# Patient Record
Sex: MALE | Race: OTHER | HISPANIC OR LATINO | URBAN - METROPOLITAN AREA
[De-identification: names, ages, dates, MRNs, and addresses within clinical notes are randomized per-mention and may not be internally consistent; named-entity substitution may affect disease eponyms.]

---

## 2020-09-28 ENCOUNTER — EMERGENCY (EMERGENCY)
Facility: HOSPITAL | Age: 35
LOS: 1 days | Discharge: HOME | End: 2020-09-28
Attending: STUDENT IN AN ORGANIZED HEALTH CARE EDUCATION/TRAINING PROGRAM
Payer: MEDICAID

## 2020-09-28 VITALS
RESPIRATION RATE: 22 BRPM | OXYGEN SATURATION: 98 % | TEMPERATURE: 98 F | SYSTOLIC BLOOD PRESSURE: 133 MMHG | HEART RATE: 75 BPM | DIASTOLIC BLOOD PRESSURE: 86 MMHG

## 2020-09-28 LAB
APPEARANCE UR: CLEAR — SIGNIFICANT CHANGE UP
BILIRUB UR-MCNC: NEGATIVE — SIGNIFICANT CHANGE UP
COLOR SPEC: YELLOW — SIGNIFICANT CHANGE UP
DIFF PNL FLD: NEGATIVE — SIGNIFICANT CHANGE UP
GLUCOSE UR QL: NEGATIVE — SIGNIFICANT CHANGE UP
KETONES UR-MCNC: NEGATIVE — SIGNIFICANT CHANGE UP
LEUKOCYTE ESTERASE UR-ACNC: NEGATIVE — SIGNIFICANT CHANGE UP
NITRITE UR-MCNC: NEGATIVE — SIGNIFICANT CHANGE UP
PH UR: 6 — SIGNIFICANT CHANGE UP (ref 5–8)
PROT UR-MCNC: NEGATIVE — SIGNIFICANT CHANGE UP
SP GR SPEC: 1.01 — SIGNIFICANT CHANGE UP (ref 1.01–1.02)
UROBILINOGEN FLD QL: NEGATIVE — SIGNIFICANT CHANGE UP

## 2020-09-28 PROCEDURE — 71046 X-RAY EXAM CHEST 2 VIEWS: CPT | Mod: 26

## 2020-09-28 PROCEDURE — 99283 EMERGENCY DEPT VISIT LOW MDM: CPT | Mod: 25

## 2020-09-28 PROCEDURE — 96372 THER/PROPH/DIAG INJ SC/IM: CPT

## 2020-09-28 PROCEDURE — 99283 EMERGENCY DEPT VISIT LOW MDM: CPT

## 2020-09-28 PROCEDURE — 81001 URINALYSIS AUTO W/SCOPE: CPT

## 2020-09-28 PROCEDURE — 93005 ELECTROCARDIOGRAM TRACING: CPT

## 2020-09-28 PROCEDURE — 71046 X-RAY EXAM CHEST 2 VIEWS: CPT

## 2020-09-28 RX ORDER — IBUPROFEN 200 MG
1 TABLET ORAL
Qty: 20 | Refills: 0
Start: 2020-09-28 | End: 2020-10-02

## 2020-09-28 RX ORDER — LIDOCAINE 4 G/100G
1 CREAM TOPICAL ONCE
Refills: 0 | Status: COMPLETED | OUTPATIENT
Start: 2020-09-28 | End: 2020-09-28

## 2020-09-28 RX ORDER — KETOROLAC TROMETHAMINE 30 MG/ML
30 SYRINGE (ML) INJECTION ONCE
Refills: 0 | Status: DISCONTINUED | OUTPATIENT
Start: 2020-09-28 | End: 2020-09-28

## 2020-09-28 RX ORDER — LIDOCAINE 4 G/100G
1 CREAM TOPICAL
Qty: 5 | Refills: 0
Start: 2020-09-28 | End: 2020-10-02

## 2020-09-28 RX ADMIN — Medication 30 MILLIGRAM(S): at 15:32

## 2020-09-28 RX ADMIN — Medication 30 MILLIGRAM(S): at 16:13

## 2020-09-28 RX ADMIN — LIDOCAINE 1 PATCH: 4 CREAM TOPICAL at 15:55

## 2020-09-28 NOTE — ED PROVIDER NOTE - CHPI ED SYMPTOMS NEG
no fever, chills, no dysuria, no nausea and vomiting, no chest pain, no shortness of breath, no rash

## 2020-09-28 NOTE — ED ADULT NURSE NOTE - NSIMPLEMENTINTERV_GEN_ALL_ED
Implemented All Universal Safety Interventions:  [place] to call system. Call bell, personal items and telephone within reach. Instruct patient to call for assistance. Room bathroom lighting operational. Non-slip footwear when patient is off stretcher. Physically safe environment: no spills, clutter or unnecessary equipment. Stretcher in lowest position, wheels locked, appropriate side rails in place.

## 2020-09-28 NOTE — ED PROVIDER NOTE - PROGRESS NOTE DETAILS
Feeling better. Discussed with patient that presentation may be early shingles and what to look for to come back to the ER. CXR NAD. ECG NSR. UA negative for blood. History and findings consistent with diagnosis fo costochondritis. Will give Rx  and PMD follow up in 2-3 days. Pt is well appearing walking with steady gait, stable for discharge and follow up without fail with medical doctor. I had a detailed discussion with the patient and/or guardian regarding the historical points, exam findings, and any diagnostic results supporting the discharge diagnosis. Pt educated on care and need for follow up. Strict return instructions and red flag signs and symptoms discussed with patient. Questions answered. Pt shows understanding of discharge information and agrees to follow.

## 2020-09-28 NOTE — ED PROVIDER NOTE - GASTROINTESTINAL, MLM
Abdomen soft, non-tender, no guarding. Abdomen soft, non-tender, no guarding. Negative Najera's sign

## 2020-09-28 NOTE — ED PROVIDER NOTE - SKIN, MLM
Skin normal color for race, warm, dry and intact. No evidence of rash. Skin normal color for race, warm, dry and intact. No evidence of rash. No vesicular rash.

## 2020-09-28 NOTE — ED PROVIDER NOTE - CLINICAL SUMMARY MEDICAL DECISION MAKING FREE TEXT BOX
Pt with right sided rib pain for 4 days. Vital signs appears with nrml limits. Given history and exam, no gallstones, no kidney stones, low suspicion of PTX. Will do UA for infection. Will give Toradol for pain.

## 2020-09-28 NOTE — ED PROVIDER NOTE - PATIENT PORTAL LINK FT
You can access the FollowMyHealth Patient Portal offered by [facility] by registering at the following website: [email or web address]. By joining [employer]’s FollowMyHealth portal, you will also be able to view your health information using other applications (apps) compatible with our system.

## 2020-09-28 NOTE — ED PROVIDER NOTE - OBJECTIVE STATEMENT
36 y/o M patient with no significant PMHx and no significant PSHx presents to the ED with c/o 4d of rib pain. Patient states taking Advil with some relief for a few hours and feels pain with movement. Patient describes the pain as sharp and continuous. Patient denies any injuries. 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 Patient denies any fever, chills, hematuria, dysuria, nausea and vomiting, chest pain, shortness of breath, rash, or any other complains. 36 y/o M patient with no significant PMHx and no significant PSHx presents to the ED with c/o 4d of rib pain. Patient states taking Advil with some relief for a few hours and feels pain with movement. Patient describes the pain as sharp and continuous, non-radiating. No aggravation of pain with food intake. Patient denies any injuries. 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 Patient denies any fever, chills, hematuria, dysuria, nausea and vomiting, chest pain, shortness of breath, rash, or any other complains.

## 2020-09-28 NOTE — ED PROVIDER NOTE - ATTENDING CONTRIBUTION TO CARE
I performed the initial face to face bedside interview with this patient regarding history of present illness, review of symptoms and past medical, social and family history.  I completed an independent physical examination.  I was the initial provider who evaluated this patient.  The history, review of symptoms and examination was documented by the scribe in my presence and I attest to the accuracy of the documentation.  I have signed out the follow up of any pending tests (i.e. labs, radiological studies) to the PA/NP.  I have discussed the patient’s plan of care and disposition with the PA/NP. well appearing male normal work of breathing, no rashes or erythema, tender along ribs 6-7. likely MSK pain.

## 2020-10-04 ENCOUNTER — EMERGENCY (EMERGENCY)
Facility: HOSPITAL | Age: 35
LOS: 1 days | Discharge: HOME | End: 2020-10-04
Attending: EMERGENCY MEDICINE
Payer: MEDICAID

## 2020-10-04 VITALS
SYSTOLIC BLOOD PRESSURE: 131 MMHG | TEMPERATURE: 98 F | WEIGHT: 179.9 LBS | DIASTOLIC BLOOD PRESSURE: 90 MMHG | HEART RATE: 78 BPM | RESPIRATION RATE: 20 BRPM | HEIGHT: 66 IN | OXYGEN SATURATION: 99 %

## 2020-10-04 PROBLEM — Z78.9 OTHER SPECIFIED HEALTH STATUS: Chronic | Status: ACTIVE | Noted: 2020-09-28

## 2020-10-04 PROCEDURE — 99283 EMERGENCY DEPT VISIT LOW MDM: CPT

## 2020-10-04 RX ORDER — VALACYCLOVIR 500 MG/1
1 TABLET, FILM COATED ORAL
Qty: 21 | Refills: 0
Start: 2020-10-04 | End: 2020-10-10

## 2020-10-04 RX ORDER — OXYCODONE AND ACETAMINOPHEN 5; 325 MG/1; MG/1
1 TABLET ORAL ONCE
Refills: 0 | Status: DISCONTINUED | OUTPATIENT
Start: 2020-10-04 | End: 2020-10-04

## 2020-10-04 RX ORDER — VALACYCLOVIR 500 MG/1
1000 TABLET, FILM COATED ORAL ONCE
Refills: 0 | Status: COMPLETED | OUTPATIENT
Start: 2020-10-04 | End: 2020-10-04

## 2020-10-04 RX ADMIN — VALACYCLOVIR 1000 MILLIGRAM(S): 500 TABLET, FILM COATED ORAL at 13:57

## 2020-10-04 RX ADMIN — OXYCODONE AND ACETAMINOPHEN 1 TABLET(S): 5; 325 TABLET ORAL at 13:57

## 2020-10-04 RX ADMIN — OXYCODONE AND ACETAMINOPHEN 1 TABLET(S): 5; 325 TABLET ORAL at 14:11

## 2020-10-04 NOTE — ED PROVIDER NOTE - ATTENDING CONTRIBUTION TO CARE
seen with acp  here for right flank pain for  days no nausea no vomiting  abd soft non tender bs active  vesicular skin lesions noted  patient has shingles  will start on valtrex and  pain relievers  Agree with acp assessment and disposition and hx and physical

## 2020-10-04 NOTE — ED ADULT NURSE NOTE - OBJECTIVE STATEMENT
Pt c/o Pain to rib area.  Rash developed to right flank area on Wednesday. No acute distress noted, denies chest pain, no shortness of breath indicated. Safety maintained.

## 2020-10-04 NOTE — ED PROVIDER NOTE - PATIENT PORTAL LINK FT
You can access the FollowMyHealth Patient Portal offered by [facility] by registering at the following website: [email or web address]. By joining [employer]’s FollowMyHealth portal, you will also be able to view your health information using other applications (apps) compatible with our system.

## 2020-10-04 NOTE — ED PROVIDER NOTE - CLINICAL SUMMARY MEDICAL DECISION MAKING FREE TEXT BOX
Patient presented 5 days ago with R flank pain and unremarkable workup. Today patient reports same pain with rash to acosta-posterior aspect with R sided ribcage consistent with shingles. Will dc with Rx for Valtrex and PMd follow up in 1-2 days.

## 2020-10-04 NOTE — ED PROVIDER NOTE - SKIN RASH DESCRIPTION
PAPULAR/right rib cage and right flank has a small papular rash along the dermatomal distribution with no crusting.

## 2020-10-04 NOTE — ED PROVIDER NOTE - OBJECTIVE STATEMENT
36 y/o M presents to ED for rash to his right flank area. Pt states he was seen 5d ago for evaluation for his right flank pain. Pt adds his pain is the same as the beginning and is partially relieved with ibuprofen but since yesterday, he noticed a rash on the right side of his rib bone. Pt denies fever, chills, nausea, vomiting, diarrhea, urinary symptoms or any other complaints. NKDA.

## 2020-10-04 NOTE — ED PROVIDER NOTE - NSFOLLOWUPINSTRUCTIONS_ED_ALL_ED_FT
Follow up with the primary care doctor in 1-2 days.  If you experience any new or worsening symptoms or if you are concerned you can always come back to the emergency for a re-evaluation.  If there were any prescriptions given to you during the visit today take them as prescribed. If you have any questions you can ask the pharmacist.

## 2020-10-04 NOTE — ED ADULT NURSE NOTE - NSIMPLEMENTINTERV_GEN_ALL_ED
Implemented All Universal Safety Interventions:  [place] to call system. Call bell, personal items and telephone within reach. Instruct patient to call for assistance. Room bathroom lighting operational. Non-slip footwear when patient is off stretcher. Physically safe environment: no spills, clutter or unnecessary equipment. Stretcher in lowest position, wheels locked, appropriate side rails in place.

## 2020-10-15 ENCOUNTER — EMERGENCY (EMERGENCY)
Facility: HOSPITAL | Age: 35
LOS: 1 days | Discharge: HOME | End: 2020-10-15
Attending: EMERGENCY MEDICINE
Payer: MEDICAID

## 2020-10-15 VITALS
HEART RATE: 73 BPM | TEMPERATURE: 98 F | RESPIRATION RATE: 19 BRPM | DIASTOLIC BLOOD PRESSURE: 94 MMHG | OXYGEN SATURATION: 98 % | HEIGHT: 66 IN | WEIGHT: 179.9 LBS | SYSTOLIC BLOOD PRESSURE: 159 MMHG

## 2020-10-15 PROCEDURE — 99283 EMERGENCY DEPT VISIT LOW MDM: CPT

## 2020-10-15 PROCEDURE — U0003: CPT

## 2020-10-15 NOTE — ED PROVIDER NOTE - PATIENT PORTAL LINK FT
You can access the FollowMyHealth Patient Portal offered by [facility] by registering at the following website: [email or web address]. By joining [employer]’s FollowMyHealth portal, you will also be able to view your health information using other applications (apps) compatible with our system.

## 2020-10-15 NOTE — ED PROVIDER NOTE - OBJECTIVE STATEMENT
34 y/o male with PMHx of HTN, HLD presents to the ED for COVID swab x today. Pt notes he is traveling soon and needs the COVID swab. Pt has no URI Sx. Pt denies fever, cough, shortness of breath, or any other complaints. NKDA.

## 2020-10-15 NOTE — ED ADULT TRIAGE NOTE - WEIGHT IN KG
Chief Complaint   Patient presents with   • Post-op     1 week s/p (R) shoulder arthroscopic RCR, debridement of labrum 05/22/2018           HPI  His 1 week follow-up right shoulder rotator cuff repair doing well.      There were no vitals filed for this visit.      Physical Exam:    Complains of some numbness in his right hand.  He noticed this since the block in surgery.    ICD-10-CM ICD-9-CM   1. Status post right rotator cuff repair Z98.890 V45.89     He will follow-up in 3 weeks to start physical therapy   81.6

## 2020-10-16 LAB — SARS-COV-2 RNA SPEC QL NAA+PROBE: SIGNIFICANT CHANGE UP

## 2021-08-06 ENCOUNTER — EMERGENCY (EMERGENCY)
Facility: HOSPITAL | Age: 36
LOS: 1 days | Discharge: HOME | End: 2021-08-06
Attending: STUDENT IN AN ORGANIZED HEALTH CARE EDUCATION/TRAINING PROGRAM
Payer: MEDICAID

## 2021-08-06 VITALS
SYSTOLIC BLOOD PRESSURE: 134 MMHG | OXYGEN SATURATION: 98 % | DIASTOLIC BLOOD PRESSURE: 87 MMHG | TEMPERATURE: 98 F | HEART RATE: 62 BPM | WEIGHT: 179.9 LBS | RESPIRATION RATE: 16 BRPM | HEIGHT: 66 IN

## 2021-08-06 LAB — SARS-COV-2 RNA SPEC QL NAA+PROBE: SIGNIFICANT CHANGE UP

## 2021-08-06 PROCEDURE — 99282 EMERGENCY DEPT VISIT SF MDM: CPT

## 2021-08-06 PROCEDURE — 87635 SARS-COV-2 COVID-19 AMP PRB: CPT

## 2021-08-06 PROCEDURE — 99283 EMERGENCY DEPT VISIT LOW MDM: CPT

## 2021-08-06 NOTE — ED PROVIDER NOTE - PATIENT PORTAL LINK FT
You can access the FollowMyHealth Patient Portal offered by [facility] by registering at the following website: [email or web address]. By joining [employer]’s FollowMyHealth portal, you will also be able to view your health information using other applications (apps) compatible with our system.

## 2021-08-06 NOTE — ED PROVIDER NOTE - OBJECTIVE STATEMENT
36y M w/ no pmh presenting for COVID swab to start work. Has been asymptomatic. No other complaints.

## 2023-08-21 NOTE — ED PROVIDER NOTE - NS_EDPROVIDERDISPOUSERTYPE_ED_A_ED
Scribe Attestation (For Scribes USE Only)... Scribe Attestation (For Scribes USE Only).../Attending Attestation (For Attendings USE Only)... EOMI/conjunctiva clear/normal

## 2024-05-08 NOTE — ED ADULT NURSE NOTE - NSFALLRSKINDICATORS_ED_ALL_ED
No care due was identified.  [facility] Embedded Care Due Messages. Reference number: [id number].   5/08/2024 12:44:17 AM CDT   no

## 2024-07-17 ENCOUNTER — INPATIENT (INPATIENT)
Facility: HOSPITAL | Age: 39
LOS: 0 days | Discharge: HOME | DRG: 395 | End: 2024-07-18
Attending: SURGERY | Admitting: SURGERY
Payer: COMMERCIAL

## 2024-07-17 ENCOUNTER — TRANSCRIPTION ENCOUNTER (OUTPATIENT)
Age: 39
End: 2024-07-17

## 2024-07-17 VITALS
HEART RATE: 66 BPM | DIASTOLIC BLOOD PRESSURE: 94 MMHG | WEIGHT: 160.06 LBS | RESPIRATION RATE: 17 BRPM | SYSTOLIC BLOOD PRESSURE: 130 MMHG | OXYGEN SATURATION: 100 % | TEMPERATURE: 98 F

## 2024-07-17 PROCEDURE — 99285 EMERGENCY DEPT VISIT HI MDM: CPT

## 2024-07-18 ENCOUNTER — TRANSCRIPTION ENCOUNTER (OUTPATIENT)
Age: 39
End: 2024-07-18

## 2024-07-18 ENCOUNTER — RESULT REVIEW (OUTPATIENT)
Age: 39
End: 2024-07-18

## 2024-07-18 VITALS
SYSTOLIC BLOOD PRESSURE: 124 MMHG | DIASTOLIC BLOOD PRESSURE: 75 MMHG | OXYGEN SATURATION: 98 % | TEMPERATURE: 97 F | HEART RATE: 73 BPM | RESPIRATION RATE: 16 BRPM

## 2024-07-18 DIAGNOSIS — K35.80 UNSPECIFIED ACUTE APPENDICITIS: ICD-10-CM

## 2024-07-18 LAB
ALBUMIN SERPL ELPH-MCNC: 4.7 G/DL — SIGNIFICANT CHANGE UP (ref 3.3–5)
ALP SERPL-CCNC: 51 U/L — SIGNIFICANT CHANGE UP (ref 40–120)
ALT FLD-CCNC: 20 U/L — SIGNIFICANT CHANGE UP (ref 10–45)
ANION GAP SERPL CALC-SCNC: 14 MMOL/L — SIGNIFICANT CHANGE UP (ref 5–17)
APPEARANCE UR: CLEAR — SIGNIFICANT CHANGE UP
APTT BLD: 24.9 SEC — SIGNIFICANT CHANGE UP (ref 24.5–35.6)
APTT BLD: 29.7 SEC — SIGNIFICANT CHANGE UP (ref 24.5–35.6)
AST SERPL-CCNC: 21 U/L — SIGNIFICANT CHANGE UP (ref 10–40)
BASOPHILS # BLD AUTO: 0.03 K/UL — SIGNIFICANT CHANGE UP (ref 0–0.2)
BASOPHILS NFR BLD AUTO: 0.4 % — SIGNIFICANT CHANGE UP (ref 0–2)
BILIRUB SERPL-MCNC: 0.9 MG/DL — SIGNIFICANT CHANGE UP (ref 0.2–1.2)
BILIRUB UR-MCNC: NEGATIVE — SIGNIFICANT CHANGE UP
BUN SERPL-MCNC: 18 MG/DL — SIGNIFICANT CHANGE UP (ref 7–23)
CALCIUM SERPL-MCNC: 10.3 MG/DL — SIGNIFICANT CHANGE UP (ref 8.4–10.5)
CHLORIDE SERPL-SCNC: 101 MMOL/L — SIGNIFICANT CHANGE UP (ref 96–108)
CO2 SERPL-SCNC: 24 MMOL/L — SIGNIFICANT CHANGE UP (ref 22–31)
COLOR SPEC: YELLOW — SIGNIFICANT CHANGE UP
CREAT SERPL-MCNC: 1.12 MG/DL — SIGNIFICANT CHANGE UP (ref 0.5–1.3)
DIFF PNL FLD: NEGATIVE — SIGNIFICANT CHANGE UP
EGFR: 86 ML/MIN/1.73M2 — SIGNIFICANT CHANGE UP
EOSINOPHIL # BLD AUTO: 0 K/UL — SIGNIFICANT CHANGE UP (ref 0–0.5)
EOSINOPHIL NFR BLD AUTO: 0 % — SIGNIFICANT CHANGE UP (ref 0–6)
GLUCOSE SERPL-MCNC: 93 MG/DL — SIGNIFICANT CHANGE UP (ref 70–99)
GLUCOSE UR QL: NEGATIVE MG/DL — SIGNIFICANT CHANGE UP
HCT VFR BLD CALC: 41.7 % — SIGNIFICANT CHANGE UP (ref 39–50)
HGB BLD-MCNC: 14.4 G/DL — SIGNIFICANT CHANGE UP (ref 13–17)
IMM GRANULOCYTES NFR BLD AUTO: 0.1 % — SIGNIFICANT CHANGE UP (ref 0–0.9)
INR BLD: 1.08 RATIO — SIGNIFICANT CHANGE UP (ref 0.85–1.18)
INR BLD: 1.13 RATIO — SIGNIFICANT CHANGE UP (ref 0.85–1.18)
KETONES UR-MCNC: ABNORMAL MG/DL
LACTATE BLDV-MCNC: 0.7 MMOL/L — SIGNIFICANT CHANGE UP (ref 0.5–2)
LEUKOCYTE ESTERASE UR-ACNC: NEGATIVE — SIGNIFICANT CHANGE UP
LIDOCAIN IGE QN: 16 U/L — SIGNIFICANT CHANGE UP (ref 7–60)
LYMPHOCYTES # BLD AUTO: 1.66 K/UL — SIGNIFICANT CHANGE UP (ref 1–3.3)
LYMPHOCYTES # BLD AUTO: 21.5 % — SIGNIFICANT CHANGE UP (ref 13–44)
MCHC RBC-ENTMCNC: 31 PG — SIGNIFICANT CHANGE UP (ref 27–34)
MCHC RBC-ENTMCNC: 34.5 GM/DL — SIGNIFICANT CHANGE UP (ref 32–36)
MCV RBC AUTO: 89.9 FL — SIGNIFICANT CHANGE UP (ref 80–100)
MONOCYTES # BLD AUTO: 0.57 K/UL — SIGNIFICANT CHANGE UP (ref 0–0.9)
MONOCYTES NFR BLD AUTO: 7.4 % — SIGNIFICANT CHANGE UP (ref 2–14)
NEUTROPHILS # BLD AUTO: 5.44 K/UL — SIGNIFICANT CHANGE UP (ref 1.8–7.4)
NEUTROPHILS NFR BLD AUTO: 70.6 % — SIGNIFICANT CHANGE UP (ref 43–77)
NITRITE UR-MCNC: NEGATIVE — SIGNIFICANT CHANGE UP
NRBC # BLD: 0 /100 WBCS — SIGNIFICANT CHANGE UP (ref 0–0)
PH UR: 5.5 — SIGNIFICANT CHANGE UP (ref 5–8)
PLATELET # BLD AUTO: 224 K/UL — SIGNIFICANT CHANGE UP (ref 150–400)
POTASSIUM SERPL-MCNC: 4 MMOL/L — SIGNIFICANT CHANGE UP (ref 3.5–5.3)
POTASSIUM SERPL-SCNC: 4 MMOL/L — SIGNIFICANT CHANGE UP (ref 3.5–5.3)
PROT SERPL-MCNC: 8.4 G/DL — HIGH (ref 6–8.3)
PROT UR-MCNC: NEGATIVE MG/DL — SIGNIFICANT CHANGE UP
PROTHROM AB SERPL-ACNC: 11.8 SEC — SIGNIFICANT CHANGE UP (ref 9.5–13)
PROTHROM AB SERPL-ACNC: 11.9 SEC — SIGNIFICANT CHANGE UP (ref 9.5–13)
RBC # BLD: 4.64 M/UL — SIGNIFICANT CHANGE UP (ref 4.2–5.8)
RBC # FLD: 12.2 % — SIGNIFICANT CHANGE UP (ref 10.3–14.5)
SODIUM SERPL-SCNC: 139 MMOL/L — SIGNIFICANT CHANGE UP (ref 135–145)
SP GR SPEC: 1.03 — SIGNIFICANT CHANGE UP (ref 1–1.03)
UROBILINOGEN FLD QL: 0.2 MG/DL — SIGNIFICANT CHANGE UP (ref 0.2–1)
WBC # BLD: 7.71 K/UL — SIGNIFICANT CHANGE UP (ref 3.8–10.5)
WBC # FLD AUTO: 7.71 K/UL — SIGNIFICANT CHANGE UP (ref 3.8–10.5)

## 2024-07-18 PROCEDURE — C9399: CPT

## 2024-07-18 PROCEDURE — 76870 US EXAM SCROTUM: CPT | Mod: 26

## 2024-07-18 PROCEDURE — 86901 BLOOD TYPING SEROLOGIC RH(D): CPT

## 2024-07-18 PROCEDURE — 74177 CT ABD & PELVIS W/CONTRAST: CPT | Mod: 26,MC

## 2024-07-18 PROCEDURE — 86900 BLOOD TYPING SEROLOGIC ABO: CPT

## 2024-07-18 PROCEDURE — 86850 RBC ANTIBODY SCREEN: CPT

## 2024-07-18 PROCEDURE — 83605 ASSAY OF LACTIC ACID: CPT

## 2024-07-18 PROCEDURE — 85730 THROMBOPLASTIN TIME PARTIAL: CPT

## 2024-07-18 PROCEDURE — 85610 PROTHROMBIN TIME: CPT

## 2024-07-18 PROCEDURE — 74177 CT ABD & PELVIS W/CONTRAST: CPT | Mod: MC

## 2024-07-18 PROCEDURE — 99285 EMERGENCY DEPT VISIT HI MDM: CPT | Mod: 25

## 2024-07-18 PROCEDURE — C1889: CPT

## 2024-07-18 PROCEDURE — 96375 TX/PRO/DX INJ NEW DRUG ADDON: CPT

## 2024-07-18 PROCEDURE — 87086 URINE CULTURE/COLONY COUNT: CPT

## 2024-07-18 PROCEDURE — 96374 THER/PROPH/DIAG INJ IV PUSH: CPT

## 2024-07-18 PROCEDURE — 81003 URINALYSIS AUTO W/O SCOPE: CPT

## 2024-07-18 PROCEDURE — 83690 ASSAY OF LIPASE: CPT

## 2024-07-18 PROCEDURE — 93975 VASCULAR STUDY: CPT

## 2024-07-18 PROCEDURE — 76870 US EXAM SCROTUM: CPT

## 2024-07-18 PROCEDURE — 93975 VASCULAR STUDY: CPT | Mod: 26

## 2024-07-18 PROCEDURE — 80053 COMPREHEN METABOLIC PANEL: CPT

## 2024-07-18 PROCEDURE — 85025 COMPLETE CBC W/AUTO DIFF WBC: CPT

## 2024-07-18 PROCEDURE — 88304 TISSUE EXAM BY PATHOLOGIST: CPT | Mod: 26

## 2024-07-18 PROCEDURE — 44970 LAPAROSCOPY APPENDECTOMY: CPT | Mod: GC

## 2024-07-18 PROCEDURE — 88304 TISSUE EXAM BY PATHOLOGIST: CPT

## 2024-07-18 DEVICE — STAPLER COVIDIEN TRI-STAPLE 45MM PURPLE RELOAD: Type: IMPLANTABLE DEVICE | Status: FUNCTIONAL

## 2024-07-18 DEVICE — CLIP APPLIER COVIDIEN ENDOCLIP III 5MM: Type: IMPLANTABLE DEVICE | Status: FUNCTIONAL

## 2024-07-18 RX ORDER — DEXTROSE MONOHYDRATE AND SODIUM CHLORIDE 5; .3 G/100ML; G/100ML
1000 INJECTION, SOLUTION INTRAVENOUS
Refills: 0 | Status: DISCONTINUED | OUTPATIENT
Start: 2024-07-18 | End: 2024-07-18

## 2024-07-18 RX ORDER — PIPERACILLIN SODIUM AND TAZOBACTAM SODIUM 3; .375 G/15ML; G/15ML
3.38 INJECTION, POWDER, LYOPHILIZED, FOR SOLUTION INTRAVENOUS ONCE
Refills: 0 | Status: DISCONTINUED | OUTPATIENT
Start: 2024-07-18 | End: 2024-07-18

## 2024-07-18 RX ORDER — PIPERACILLIN SODIUM AND TAZOBACTAM SODIUM 3; .375 G/15ML; G/15ML
3.38 INJECTION, POWDER, LYOPHILIZED, FOR SOLUTION INTRAVENOUS EVERY 8 HOURS
Refills: 0 | Status: DISCONTINUED | OUTPATIENT
Start: 2024-07-18 | End: 2024-07-18

## 2024-07-18 RX ORDER — HYDROMORPHONE HCL 0.2 MG/ML
0.2 INJECTION, SOLUTION INTRAVENOUS EVERY 6 HOURS
Refills: 0 | Status: DISCONTINUED | OUTPATIENT
Start: 2024-07-18 | End: 2024-07-18

## 2024-07-18 RX ORDER — MORPHINE SULFATE 100 MG/1
4 TABLET, EXTENDED RELEASE ORAL ONCE
Refills: 0 | Status: DISCONTINUED | OUTPATIENT
Start: 2024-07-18 | End: 2024-07-18

## 2024-07-18 RX ORDER — ACETAMINOPHEN 325 MG
1000 TABLET ORAL EVERY 6 HOURS
Refills: 0 | Status: DISCONTINUED | OUTPATIENT
Start: 2024-07-18 | End: 2024-07-18

## 2024-07-18 RX ORDER — ONDANSETRON HYDROCHLORIDE 2 MG/ML
4 INJECTION INTRAMUSCULAR; INTRAVENOUS ONCE
Refills: 0 | Status: DISCONTINUED | OUTPATIENT
Start: 2024-07-18 | End: 2024-07-18

## 2024-07-18 RX ORDER — ENOXAPARIN SODIUM 100 MG/ML
40 INJECTION SUBCUTANEOUS EVERY 24 HOURS
Refills: 0 | Status: DISCONTINUED | OUTPATIENT
Start: 2024-07-18 | End: 2024-07-18

## 2024-07-18 RX ORDER — OXYCODONE HYDROCHLORIDE 100 MG/5ML
1 SOLUTION ORAL
Qty: 12 | Refills: 0
Start: 2024-07-18 | End: 2024-07-20

## 2024-07-18 RX ORDER — HYDROMORPHONE HCL 0.2 MG/ML
0.5 INJECTION, SOLUTION INTRAVENOUS EVERY 6 HOURS
Refills: 0 | Status: DISCONTINUED | OUTPATIENT
Start: 2024-07-18 | End: 2024-07-18

## 2024-07-18 RX ORDER — HYDROMORPHONE HCL 0.2 MG/ML
0.5 INJECTION, SOLUTION INTRAVENOUS
Refills: 0 | Status: DISCONTINUED | OUTPATIENT
Start: 2024-07-18 | End: 2024-07-18

## 2024-07-18 RX ORDER — PIPERACILLIN SODIUM AND TAZOBACTAM SODIUM 3; .375 G/15ML; G/15ML
3.38 INJECTION, POWDER, LYOPHILIZED, FOR SOLUTION INTRAVENOUS ONCE
Refills: 0 | Status: COMPLETED | OUTPATIENT
Start: 2024-07-18 | End: 2024-07-18

## 2024-07-18 RX ORDER — AMOXICILLIN/POTASSIUM CLAV 250-125 MG
875 TABLET ORAL
Qty: 8 | Refills: 0
Start: 2024-07-18 | End: 2024-07-21

## 2024-07-18 RX ADMIN — HYDROMORPHONE HCL 0.5 MILLIGRAM(S): 0.2 INJECTION, SOLUTION INTRAVENOUS at 14:50

## 2024-07-18 RX ADMIN — HYDROMORPHONE HCL 0.5 MILLIGRAM(S): 0.2 INJECTION, SOLUTION INTRAVENOUS at 14:30

## 2024-07-18 RX ADMIN — HYDROMORPHONE HCL 0.5 MILLIGRAM(S): 0.2 INJECTION, SOLUTION INTRAVENOUS at 15:00

## 2024-07-18 RX ADMIN — DEXTROSE MONOHYDRATE AND SODIUM CHLORIDE 120 MILLILITER(S): 5; .3 INJECTION, SOLUTION INTRAVENOUS at 09:32

## 2024-07-18 RX ADMIN — HYDROMORPHONE HCL 0.5 MILLIGRAM(S): 0.2 INJECTION, SOLUTION INTRAVENOUS at 14:40

## 2024-07-18 RX ADMIN — MORPHINE SULFATE 4 MILLIGRAM(S): 100 TABLET, EXTENDED RELEASE ORAL at 05:25

## 2024-07-18 RX ADMIN — PIPERACILLIN SODIUM AND TAZOBACTAM SODIUM 200 GRAM(S): 3; .375 INJECTION, POWDER, LYOPHILIZED, FOR SOLUTION INTRAVENOUS at 07:01

## 2024-07-18 RX ADMIN — HYDROMORPHONE HCL 0.5 MILLIGRAM(S): 0.2 INJECTION, SOLUTION INTRAVENOUS at 09:43

## 2024-07-18 NOTE — ED PROVIDER NOTE - PROGRESS NOTE DETAILS
Ochoa Brown, DO (PGY-2) CT concerning for appendicitis. Pt given additional pain control. Surg consulted. Pt aware of ct findings. Oh[place]a Brown DO (PGY-2) Surg to admit under Dr. Mcgarry. Patient aware.

## 2024-07-18 NOTE — ED PROVIDER NOTE - PHYSICAL EXAMINATION
General: well appearing, interactive, well nourished, no apparent distress, ncat  HEENT: EOMI, PERRLA, normal mucosa, normal oropharynx, no lesions on the lips or on oral mucosa, normal external ear  Neck: supple, no lymphadenopathy, full range of motion, no nuchal rigidity  CV: RRR, normal S1 and S2 with no murmur, capillary refill less than two seconds  Resp: lungs CTA b/l, good aeration bilaterally, symmetric chest wall   Abd: Mild RLQ ttp no rebound guarding or rigidity. Negative psoas sign, neg rovsing's sign.   : (exam done in presence of Dr. Kiser chaperone at bedside for duration of exam)- Normal external male genitalia, normal cremasteric reflex b/l, no testicular ttp, no palpable inguinal hernias.  no CVA tenderness  MSK: full range of motion, no cyanosis, no edema, no clubbing, no immobility  Neuro: CN II-XII grossly intact, muscle strength 5/5 in all extremities, normal gait  Skin: no rashes, skin intact

## 2024-07-18 NOTE — H&P ADULT - ATTENDING COMMENTS
Patient seen and examined and agree with above.     38 year old male with no significant past medical history who presents with 4 days of abdominal pain which progressively worsened through the week. Pain remains constant and is in the lower abdominal area, especially in the pelvic area.  He denies chest pain or dyspnea.  I have reviewed PMH, PSH, labs, meds and imaging.  He is hemodynamically stable.   Physical exam significant for pain with focal peritonitis in the right lower quadrant. No masses or hernias noted.   Labs reviewed and significant for WBC    Type and screen available.   CT scan demonstrates acute appendicitis with fecalith.    38 year old male with acute appendicitis who has agreed to a laparoscopic appendectomy. I have explained the risks and benefits of the procedure. He has signed the consent.  NPO, IVF  Antibiotics given  I have explained the care plan to the patient .

## 2024-07-18 NOTE — ED ADULT NURSE NOTE - OBJECTIVE STATEMENT
Pt is a 39 y/o M with significant PMH presenting with lower abdominal pain radiating to groin and testicles. Pt endorses pain starting 2 months ago progressing significantly over the past week. Upon assessment pt is a/o x 3 speaking coherently in full sentences. Pt is breathing unlabored and equal BL in no apparent distress, radial pulses are 2+ BL, abdomen is soft, nondistended, and TTP in BL L abdomen. Pt denies fevers/chills, headaches/vision changes, dysuria, hematuria, chest pain/SOB, n/v/d, or changes in urinary/defecation habits. Pt is in stretcher in lowest position with side rails up.

## 2024-07-18 NOTE — PRE-OP CHECKLIST - HAND OFF
NO-SHOW LETTER MAILED TO     [facility], [address] [place] [address]
NS: Patient marked as No Show for therapy appointment today  per Pj Ramirez 
Unit RN to OR RN

## 2024-07-18 NOTE — ED PROVIDER NOTE - CLINICAL SUMMARY MEDICAL DECISION MAKING FREE TEXT BOX
Patient is a 38-year-old male no reported past medical history (notes he has not followed with a physician in many years) presents complaining of 2 months lower abdominal pain radiating to his groin and testicles. The pain has worsened over the past week. Denies N/V fevers or chills but is having some loose stools. Denies hematuria, dysuria, increased urinary frequency. States he is sexually active with one female partner, no known history of std/sti. Denies history of abdominal surgeries. Admits to ETOH use most days 1-2 drinks. Smokes cigars occasionally/socially. Denies recreational drug use.   On exam he has mild RLQ abd ttp. No guarding or rigidity.  exam unremarkable  DDx- low suspicion for acute intraabdominal surgical pathology but he is midly tender and well localized to RLQ and infraumbilical will obtain ct with contrast. Low suspicion for hernia/torsion will obtain scrotal ultrasound. Labs including lipase to eval for pancreatitis but lower suspicion.

## 2024-07-18 NOTE — CHART NOTE - NSCHARTNOTEFT_GEN_A_CORE
STATUS POST:  Laparoscopic appendectomy    POST OPERATIVE DAY #: 0    SUBJECTIVE: Pt seen in bed, endorses mild pain, not yet void or ambulate  SOB:  [ ] YES [ x] NO  Chest Discomfort: [ ] YES [x ] NO    Nausea: [ ] YES [ x] NO           Vomiting: [ ] YES [ x] NO  Flatus: [ ] YES [x ] NO             Bowel Movement: [ ] YES [x ] NO  Diarrhea: [ ] YES [ x] NO         Void: [ ]YES [x ]No  Constipation: [ ] YES [ x] NO     Pain (0-10):  3            Pain Control Adequate: [ x] YES [ ] NO  [place]: No  NGT: No    Vital Signs Last 24 Hrs  T(C): 36.2 (2024 17:00), Max: 37 (2024 12:10)  T(F): 97.2 (2024 17:00), Max: 98.6 (2024 12:10)  HR: 93 (2024 17:00) (53 - 93)  BP: 157/65 (2024 17:00) (112/67 - 157/65)  BP(mean): 94 (2024 17:00) (83 - 101)  RR: 16 (2024 17:00) (16 - 17)  SpO2: 96% (2024 17:00) (94% - 100%)    Parameters below as of 2024 17:00  Patient On (Oxygen Delivery Method): room air      I&O's Summary    I&O's Detail      MEDICATIONS  (STANDING):  lactated ringers. 1000 milliLiter(s) (75 mL/Hr) IV Continuous <Continuous>  lactated ringers. 1000 milliLiter(s) (75 mL/Hr) IV Continuous <Continuous>    MEDICATIONS  (PRN):  HYDROmorphone  Injectable 0.5 milliGRAM(s) IV Push every 10 minutes PRN Moderate Pain (4 - 6)  ondansetron Injectable 4 milliGRAM(s) IV Push once PRN Nausea and/or Vomiting      LABS:                        14.4   7.71  )-----------( 224      ( 2024 01:46 )             41.7     07-18    139  |  101  |  18  ----------------------------<  93  4.0   |  24  |  1.12    Ca    10.3      2024 01:46    TPro  8.4<H>  /  Alb  4.7  /  TBili  0.9  /  DBili  x   /  AST  21  /  ALT  20  /  AlkPhos  51  07-18    PT/INR - ( 2024 07:29 )   PT: 11.8 sec;   INR: 1.13 ratio         PTT - ( 2024 07:29 )  PTT:24.9 sec  Urinalysis Basic - ( 2024 01:46 )    Color: Yellow / Appearance: Clear / S.027 / pH: x  Gluc: 93 mg/dL / Ketone: Trace mg/dL  / Bili: Negative / Urobili: 0.2 mg/dL   Blood: x / Protein: Negative mg/dL / Nitrite: Negative   Leuk Esterase: Negative / RBC: x / WBC x   Sq Epi: x / Non Sq Epi: x / Bacteria: x    PHYSICAL EXAM:     GENERAL: NAD,  HEAD:  Atraumatic, normocephalic  EYES: EOMI, PERRLA  HEART: Regular rate and rhythm  LUNGS: Unlabored respirations.    ABDOMEN: Soft, minimally tender, incision c/d/i  NERVOUS SYSTEM:  A&Ox3, no focal deficits     A/P: 38y Male with Acute appendicitis now s/p lap appy  - Adequate pain control   - Diet: Regular  - Encourage ambulation and PO intake   - D/c when PACU criteria met    Trauma/ACS [id number]

## 2024-07-18 NOTE — ED ADULT NURSE NOTE - NSFALLUNIVINTERV_ED_ALL_ED
Bed/Stretcher in lowest position, wheels locked, appropriate side rails in place/Call bell, personal items and telephone in reach/Instruct patient to call for assistance before getting out of bed/chair/stretcher/Non-slip footwear applied when patient is off stretcher/[place] to call system/Physically safe environment - no spills, clutter or unnecessary equipment/Purposeful proactive rounding/Room/bathroom lighting operational, light cord in reach

## 2024-07-18 NOTE — PRE-ANESTHESIA EVALUATION ADULT - NSANTHPMHFT_GEN_ALL_CORE
38M with no significant PMH/PSH with 2 months of RLQ pain with worsening over the last week, found on imaging to have acute appendicitis, possible perforation at distal tip, now presents for emergent laparoscopic appendectomy, possible open on 7/18/2024.

## 2024-07-18 NOTE — ASU DISCHARGE PLAN (ADULT/PEDIATRIC) - CARE PROVIDER_API CALL
Addie Herrera  Surgical Critical Care  [address] [place] [address], [address]  [place] [address]  Phone: [phone]  Fax: [phone]  Follow Up Time: 2 weeks

## 2024-07-18 NOTE — H&P ADULT - NSHPLABSRESULTS_GEN_ALL_CORE
< from: CT Abdomen and Pelvis w/ IV Cont (07.18.24 @ 04:35) >    IMPRESSION:    Acute appendicitis. Small pelvic free fluid with minimal peritoneal   enhancement. Recommend clinical correlation to assess perforation. No   intraperitoneal free air or bowel obstruction.    Bladder wall thickening, presumably partial distention. Recommend   correlation with urinalysis to assess UTI.    Nonspecific pulmonary nodules. Recommend comparison to previous outside   study. Follow-up may be obtained if a prior study is not available.    Additional findings as described.    --- End of Report ---    < end of copied text >

## 2024-07-18 NOTE — H&P ADULT - ASSESSMENT
38M with no PMH or PSHx presenting with 2 months of RLQ pain with worsening over the last week, symptoms and imaging consistent with acute appendicitis, possible perforation at distal tip.      PLAN:  - Admit to ACS under Dr. Mcgarry  - Added on for lap appy  - IV Zosyn  - NPO/IVF  - Consent obtained and in chart  - Pain control as needed    Discussed with Dr. Mcgarry    Trauma [id number]

## 2024-07-18 NOTE — PATIENT PROFILE ADULT - FALL HARM RISK - UNIVERSAL INTERVENTIONS
Bed in lowest position, wheels locked, appropriate side rails in place/Call bell, personal items and telephone in reach/Instruct patient to call for assistance before getting out of bed or chair/Non-slip footwear when patient is out of bed/[place] to call system/Physically safe environment - no spills, clutter or unnecessary equipment/Purposeful Proactive Rounding/Room/bathroom lighting operational, light cord in reach

## 2024-07-18 NOTE — ASU DISCHARGE PLAN (ADULT/PEDIATRIC) - MEDICATION INSTRUCTIONS
Take Tylenol up to 1000mg and Ibuprofen up to 600mg every 6 hours, alternating so that you take one medication, then the other every 3 hours. Use Oxycodone only for severe pain not controlled with the Tylenol and Ibuprofen.

## 2024-07-18 NOTE — H&P ADULT - HISTORY OF PRESENT ILLNESS
38M with no PMH or PSHx presenting with 2 months of RLQ pain that has worsened over the last 2 weeks. Thought maybe it was work-related, as he does strenuous work as . Episodes of sharp RLQ pain began 2 months ago, lasted a few minutes, and resolved on its own. Over the last 2 weeks, pain worsened, was burning in nature, and extended into LLQ. Reports noticed pain even more 6 days ago and presented to ED for evaluation.     In the ED, patient afebrile, hemodynamically wnl. WBC normal.

## 2024-07-18 NOTE — H&P ADULT - NSHPPHYSICALEXAM_GEN_ALL_CORE
Take the medication and prescribing you as directed every 6 hours for nausea and vomiting.  Increase clear liquids for the next 24 hours, use Pedialyte for your child instead of formula as it is easier to digest.  Tomorrow if there is no vomiting please start the brat diet (bananas, white rice, applesauce, toast) this will help with the diarrhea.  Follow-up with your primary care doctor on Monday for recheck and return to the ER if uncontrolled vomiting or worsening.  
VITAL SIGNS:  Vital Signs Last 24 Hrs  T(C): 36.8 (18 Jul 2024 01:14), Max: 36.8 (18 Jul 2024 01:14)  T(F): 98.2 (18 Jul 2024 01:14), Max: 98.2 (18 Jul 2024 01:14)  HR: 62 (18 Jul 2024 05:31) (53 - 66)  BP: 122/91 (18 Jul 2024 05:31) (122/91 - 142/108)  BP(mean): --  RR: 16 (18 Jul 2024 05:20) (16 - 17)  SpO2: 100% (18 Jul 2024 05:20) (97% - 100%)    Parameters below as of 18 Jul 2024 01:14  Patient On (Oxygen Delivery Method): room air          PHYSICAL EXAM:    General: NAD, Sitting in bed comfortably  HEENT: NC/AT, EOMI  Neck: Soft, supple  Cardio: RRR, nml S1/S2  Resp: Good effort, CTA b/l  Thorax: No chest wall tenderness  Breast: No lesions/masses, no drainage  GI/Abd: Soft, nondistended, tender in RLQ, +Psoas sign  Vascular: Extremities warm  Skin: Intact, no breakdown  Musculoskeletal: All 4 extremities moving spontaneously, no limitations

## 2024-07-18 NOTE — ED PROVIDER NOTE - ATTENDING CONTRIBUTION TO CARE
Jaylan Kiser DO: I have personally performed a face to face medical and diagnostic evaluation of the patient. I have discussed with and reviewed the Resident's and/or ACP's and/or Medical/PA/NP student's note and agree with the History, ROS, Physical Exam and MDM unless otherwise indicated. A brief summary of my personal evaluation and impression can be found below.     Patient is a 38-year-old male no reported past medical history (notes he has not followed with a physician in many years) presents complaining of 2 months lower abdominal pain radiating to his groin and testicles. The pain has worsened over the past week. Denies N/V fevers or chills but is having some loose stools. Denies hematuria, dysuria, increased urinary frequency. States he is sexually active with one female partner, no known history of std/sti. Denies history of abdominal surgeries. Admits to ETOH use most days 1-2 drinks. Smokes cigars occasionally/socially. Denies recreational drug use.     CONSTITUTIONAL: NAD  SKIN: Warm dry, normal skin turgor  HEAD: NCAT  EYES: EOMI, PERRLA, no scleral icterus, conjunctiva pink  NECK: Supple; non tender. Full ROM.  CARD: RRR  RESP: No respiratory distress  ABD: rlq tenderness  MSK: Full ROM, no leg swelling  PSYCH: Cooperative, appropriate.     Physical exam concerning for possible intra-abdominal pathology although with chronicity less likely acute process.  Will obtain CT abdomen pelvis with contrast to evaluate.  No scrotal tenderness or pain, no concern for testicular torsion at this time, nephrolithiasis less likely based on no CVA tenderness and no flank pain will evaluate on CT, give pain control as necessary, follow CT results.  No concern for UTI

## 2024-07-18 NOTE — BRIEF OPERATIVE NOTE - OPERATION/FINDINGS
Dilated and inflamed appendix, no clear evidence of perforation  Mesoappendix taken with [place] Ligasure  Appendix transected with Purple #45 EndoGIA stapler. 2 5mm metal clips applied to staple line for hemostasis. Purulent fluid suctioned from pelvis.

## 2024-07-19 LAB
CULTURE RESULTS: NO GROWTH — SIGNIFICANT CHANGE UP
SPECIMEN SOURCE: SIGNIFICANT CHANGE UP

## 2024-07-23 LAB — SURGICAL PATHOLOGY STUDY: SIGNIFICANT CHANGE UP

## 2024-07-26 PROBLEM — Z78.9 OTHER SPECIFIED HEALTH STATUS: Chronic | Status: ACTIVE | Noted: 2024-07-18

## 2024-07-30 NOTE — ED PROVIDER NOTE - DISCHARGE DATE
Assessment/Plan:  Assessment & Plan   Diagnoses and all orders for this visit:    Radiculopathy, cervical region  -     Ambulatory referral to Spine & Pain Management; Future  -     gabapentin (Neurontin) 300 mg capsule; Take 1 capsule (300 mg total) by mouth 2 (two) times a day    Foraminal stenosis of lumbar region  -     Ambulatory referral to Spine & Pain Management; Future    Protrusion of cervical intervertebral disc  -     Ambulatory referral to Spine & Pain Management; Future        72-year-old right-hand-dominant male with neck and right upper extremity pain and numbness more than 3 months duration.  Discussed with patient MRI results, impression, and plan.  MRI cervical spine noted for multilevel bulging disc and facet arthropathy.  There is right paracentral disc protrusion at C7-T1 with near abutment of the right ventral aspect of the cord and moderate right foraminal narrowing.  Clinical impression is that symptoms of right upper extremity weakness and numbness are due to cervical spine pathology contribute to radiculopathy.  Symptoms persist despite formal therapy and home exercises.  I advised a surgery not warranted at this time.  At this time I will refer him to pain management for further evaluation and recommendation treatment.  Will do trial of nerve modulating medication.  He is to take gabapentin 300 mg at night for 3 nights and then titrate to twice daily.  He will follow-up with me as needed.        Subjective:   Patient ID: Tc Stovall is a 72 y.o. male.  Chief Complaint   Patient presents with    Neck - Follow-up    Right Arm - Follow-up, Numbness        72-year-old right-hand-dominant male following up for neck and right upper extremity pain and numbness more than 3 months duration.  He was last seen by me 4 weeks ago at which point he was referred for MRI to cervical spine.  He denies change in symptoms since his last visit.  He has pain described as localized to the right side of neck  "and radiating to the shoulder blade and distally to the forearm and hand, achy and burning, associated numbness and tingling, worse with activity, associated with weakness, and improved with resting.    Arm Pain  This is a new problem. The current episode started more than 1 month ago. The problem occurs constantly. The problem has been unchanged. Associated symptoms include arthralgias, numbness and weakness. Pertinent negatives include no joint swelling. He has tried rest, position changes and NSAIDs (Physical therapy, home exercise) for the symptoms. The treatment provided mild relief.               Review of Systems   Musculoskeletal:  Positive for arthralgias. Negative for joint swelling.   Neurological:  Positive for weakness and numbness.       Objective:  Vitals:    07/30/24 1112   BP: 124/74   Pulse: 94   Weight: 103 kg (227 lb)   Height: 5' 7\" (1.702 m)      Ortho Exam    Physical Exam  Vitals and nursing note reviewed.   Constitutional:       Appearance: Normal appearance. He is well-developed. He is not ill-appearing or diaphoretic.   HENT:      Head: Normocephalic and atraumatic.      Right Ear: External ear normal.      Left Ear: External ear normal.   Eyes:      Conjunctiva/sclera: Conjunctivae normal.   Neck:      Trachea: No tracheal deviation.   Cardiovascular:      Rate and Rhythm: Normal rate.   Pulmonary:      Effort: Pulmonary effort is normal. No respiratory distress.   Abdominal:      General: There is no distension.   Skin:     General: Skin is warm and dry.      Coloration: Skin is not jaundiced or pale.   Neurological:      Mental Status: He is alert and oriented to person, place, and time.   Psychiatric:         Mood and Affect: Mood normal.         Behavior: Behavior normal.         Thought Content: Thought content normal.         Judgment: Judgment normal.         I have personally reviewed pertinent films in PACS and my interpretation is  .  Cervical spine multiple levels disc bulging, " with disc protrusion C7-T1.    More than 31 minutes were spent with reviewing patient chart, reviewing and interpreting imaging studies, obtaining history from patient, discussing and implementing treatment plan.     28-Sep-2020

## 2024-08-05 ENCOUNTER — APPOINTMENT (OUTPATIENT)
Dept: TRAUMA SURGERY | Facility: CLINIC | Age: 39
End: 2024-08-05

## 2024-08-05 PROCEDURE — 99024 POSTOP FOLLOW-UP VISIT: CPT

## 2024-08-05 NOTE — HISTORY OF PRESENT ILLNESS
[de-identified] : 39M s/p uncomplicated lap appy with Dr. Herrera on 7/18/24.  No complaints.  Exam: glue off, no infx or hernia at port sites.  Path: acute appy no malignancy Discussed core muscle use restriction, note given for return to work.  follow up prn.

## 2024-10-04 NOTE — ED ADULT TRIAGE NOTE - WEIGHT METHOD
Please follow up PCP.  Recommend 2 drops every 4 hours for about the next 5 to 7 days or until complete resolution of left eye infection.  Recommend tylenol 650 mg and ibuprofen 600 mg every 6 hours as needed for pain. Please return for severe chest pain, significant shortness of breath, severely worsening symptoms, or any other concerning signs or symptoms. Please refer to the following documents for additional instructions and return precautions.   
stated

## 2025-05-05 NOTE — ED ADULT NURSE NOTE - SUICIDE SCREENING QUESTION 3
Epic   No

## (undated) DEVICE — MEDICATION LABELS W MARKER

## (undated) DEVICE — GLV 8 PROTEXIS (WHITE)

## (undated) DEVICE — GLV 7.5 PROTEXIS (WHITE)

## (undated) DEVICE — LIGASURE BLUNT TIP 37CM

## (undated) DEVICE — DRAPE INSTRUMENT POUCH 6.75" X 11"

## (undated) DEVICE — STAPLER COVIDIEN ENDO GIA STANDARD HANDLE

## (undated) DEVICE — DRAPE TOWEL BLUE 17" X 24"

## (undated) DEVICE — DRSG TEGADERM 6"X8"

## (undated) DEVICE — GLV 8.5 PROTEXIS (WHITE)

## (undated) DEVICE — ENDOCATCH 10MM SPECIMEN POUCH

## (undated) DEVICE — TROCAR COVIDIEN STEP 5MM SHORT 70MM

## (undated) DEVICE — SOL IRR POUR NS 0.9% 500ML

## (undated) DEVICE — POSITIONER FOAM EGG CRATE ULNAR 2PCS (PINK)

## (undated) DEVICE — TUBING INSUFFLATION LAP FILTER 10FT

## (undated) DEVICE — GOWN TRIMAX LG

## (undated) DEVICE — LIGASURE IMPACT

## (undated) DEVICE — WARMING BLANKET UPPER ADULT

## (undated) DEVICE — TROCAR APPLIED MEDICAL KII BALLOON BLUNT TIP 12MM X 130MM

## (undated) DEVICE — DRSG STERISTRIPS 0.5 X 4"

## (undated) DEVICE — INSUFFLATION NDL COVIDIEN STEP 14G SHORT FOR STEP/VERSASTEP

## (undated) DEVICE — PREP CHLORAPREP HI-LITE ORANGE 26ML

## (undated) DEVICE — ELCTR BOVIE PENCIL SMOKE EVACUATION

## (undated) DEVICE — GLV 6.5 PROTEXIS (WHITE)

## (undated) DEVICE — DISSECTOR ENDO PEANUT 5MM

## (undated) DEVICE — SUT POLYSORB 0 30" GS-23

## (undated) DEVICE — PACK LAP CHOLE LAP APPENDECTOMY

## (undated) DEVICE — TROCAR COVIDIEN VERSASTEP PLUS 12MM STANDARD

## (undated) DEVICE — DRSG OPSITE 2.5 X 2"

## (undated) DEVICE — GLV 7 PROTEXIS (WHITE)

## (undated) DEVICE — SUT BIOSYN 4-0 18" P-12

## (undated) DEVICE — TROCAR APPLIED MEDICAL KII BALLOON BLUNT TIP 12MM X 100MM

## (undated) DEVICE — VENODYNE/SCD SLEEVE CALF MEDIUM

## (undated) DEVICE — TROCAR COVIDIEN VERSASTEP PLUS 11MM STANDARD

## (undated) DEVICE — INSUFFLATION NDL COVIDIEN STEP 14G FOR STEP/VERSASTEP

## (undated) DEVICE — TROCAR COVIDIEN BLUNT TIP HASSAN 12MM

## (undated) DEVICE — TROCAR COVIDIEN BLUNT TIP HASSAN 10MM

## (undated) DEVICE — VALVE YELLOW PORT SEAL PLUS 5MM

## (undated) DEVICE — BLADE SCALPEL SAFETYLOCK #15

## (undated) DEVICE — LIGASURE MARYLAND 37CM

## (undated) DEVICE — SOL IRR POUR H2O 250ML

## (undated) DEVICE — SPECIMEN CONTAINER 100ML

## (undated) DEVICE — TUBING IRRIGATION DAVOL SYSTEM X STREAM

## (undated) DEVICE — DRAPE GENERAL ENDOSCOPY

## (undated) DEVICE — TROCAR ETHICON ENDOPATH XCEL BLADELESS 5MM X 100MM STABILITY

## (undated) DEVICE — D HELP - CLEARVIEW CLEARIFY SYSTEM

## (undated) DEVICE — TROCAR COVIDIEN VERSASTEP 5MM SHORT

## (undated) DEVICE — BLADE SCALPEL SAFETYLOCK #10